# Patient Record
Sex: MALE | Race: BLACK OR AFRICAN AMERICAN | NOT HISPANIC OR LATINO | URBAN - METROPOLITAN AREA
[De-identification: names, ages, dates, MRNs, and addresses within clinical notes are randomized per-mention and may not be internally consistent; named-entity substitution may affect disease eponyms.]

---

## 2024-08-07 ENCOUNTER — INPATIENT (INPATIENT)
Facility: HOSPITAL | Age: 64
LOS: 0 days | Discharge: HOME | End: 2024-08-08
Attending: STUDENT IN AN ORGANIZED HEALTH CARE EDUCATION/TRAINING PROGRAM | Admitting: STUDENT IN AN ORGANIZED HEALTH CARE EDUCATION/TRAINING PROGRAM
Payer: MEDICARE

## 2024-08-07 ENCOUNTER — TRANSCRIPTION ENCOUNTER (OUTPATIENT)
Age: 64
End: 2024-08-07

## 2024-08-07 VITALS
TEMPERATURE: 98 F | WEIGHT: 190.04 LBS | RESPIRATION RATE: 16 BRPM | DIASTOLIC BLOOD PRESSURE: 85 MMHG | HEART RATE: 71 BPM | OXYGEN SATURATION: 99 % | SYSTOLIC BLOOD PRESSURE: 158 MMHG

## 2024-08-07 DIAGNOSIS — E87.8 OTHER DISORDERS OF ELECTROLYTE AND FLUID BALANCE, NOT ELSEWHERE CLASSIFIED: ICD-10-CM

## 2024-08-07 DIAGNOSIS — Z29.9 ENCOUNTER FOR PROPHYLACTIC MEASURES, UNSPECIFIED: ICD-10-CM

## 2024-08-07 DIAGNOSIS — N18.6 END STAGE RENAL DISEASE: ICD-10-CM

## 2024-08-07 DIAGNOSIS — E87.5 HYPERKALEMIA: ICD-10-CM

## 2024-08-07 DIAGNOSIS — D63.8 ANEMIA IN OTHER CHRONIC DISEASES CLASSIFIED ELSEWHERE: ICD-10-CM

## 2024-08-07 DIAGNOSIS — Z90.49 ACQUIRED ABSENCE OF OTHER SPECIFIED PARTS OF DIGESTIVE TRACT: Chronic | ICD-10-CM

## 2024-08-07 DIAGNOSIS — I10 ESSENTIAL (PRIMARY) HYPERTENSION: ICD-10-CM

## 2024-08-07 DIAGNOSIS — Z90.81 ACQUIRED ABSENCE OF SPLEEN: Chronic | ICD-10-CM

## 2024-08-07 LAB
ALBUMIN SERPL ELPH-MCNC: 4 G/DL — SIGNIFICANT CHANGE UP (ref 3.3–5)
ALP SERPL-CCNC: 63 U/L — SIGNIFICANT CHANGE UP (ref 40–120)
ALT FLD-CCNC: 11 U/L — SIGNIFICANT CHANGE UP (ref 4–41)
ANION GAP SERPL CALC-SCNC: 19 MMOL/L — HIGH (ref 7–14)
AST SERPL-CCNC: 13 U/L — SIGNIFICANT CHANGE UP (ref 4–40)
BASOPHILS # BLD AUTO: 0.02 K/UL — SIGNIFICANT CHANGE UP (ref 0–0.2)
BASOPHILS NFR BLD AUTO: 0.5 % — SIGNIFICANT CHANGE UP (ref 0–2)
BILIRUB SERPL-MCNC: 0.3 MG/DL — SIGNIFICANT CHANGE UP (ref 0.2–1.2)
BUN SERPL-MCNC: 106 MG/DL — HIGH (ref 7–23)
CALCIUM SERPL-MCNC: 8.6 MG/DL — SIGNIFICANT CHANGE UP (ref 8.4–10.5)
CALCIUM SERPL-MCNC: 8.9 MG/DL — SIGNIFICANT CHANGE UP (ref 8.4–10.5)
CHLORIDE SERPL-SCNC: 98 MMOL/L — SIGNIFICANT CHANGE UP (ref 98–107)
CO2 SERPL-SCNC: 19 MMOL/L — LOW (ref 22–31)
CO2 SERPL-SCNC: 23 MMOL/L — SIGNIFICANT CHANGE UP (ref 22–31)
CREAT SERPL-MCNC: 20.43 MG/DL — HIGH (ref 0.5–1.3)
DIALYSIS INSTRUMENT RESULT - HEPATITIS B SURFACE ANTIGEN: NEGATIVE — SIGNIFICANT CHANGE UP
EGFR: 2 ML/MIN/1.73M2 — LOW
EOSINOPHIL # BLD AUTO: 0.23 K/UL — SIGNIFICANT CHANGE UP (ref 0–0.5)
EOSINOPHIL NFR BLD AUTO: 5.2 % — SIGNIFICANT CHANGE UP (ref 0–6)
GLUCOSE BLDC GLUCOMTR-MCNC: 105 MG/DL — HIGH (ref 70–99)
GLUCOSE BLDC GLUCOMTR-MCNC: 93 MG/DL — SIGNIFICANT CHANGE UP (ref 70–99)
GLUCOSE SERPL-MCNC: 106 MG/DL — HIGH (ref 70–99)
HCT VFR BLD CALC: 26.2 % — LOW (ref 39–50)
HCT VFR BLD CALC: 26.9 % — LOW (ref 39–50)
HGB BLD-MCNC: 8.5 G/DL — LOW (ref 13–17)
HGB BLD-MCNC: 8.9 G/DL — LOW (ref 13–17)
IANC: 2.93 K/UL — SIGNIFICANT CHANGE UP (ref 1.8–7.4)
IMM GRANULOCYTES NFR BLD AUTO: 0.2 % — SIGNIFICANT CHANGE UP (ref 0–0.9)
LYMPHOCYTES # BLD AUTO: 0.71 K/UL — LOW (ref 1–3.3)
LYMPHOCYTES # BLD AUTO: 16 % — SIGNIFICANT CHANGE UP (ref 13–44)
MAGNESIUM SERPL-MCNC: 1.9 MG/DL — SIGNIFICANT CHANGE UP (ref 1.6–2.6)
MAGNESIUM SERPL-MCNC: 2.1 MG/DL — SIGNIFICANT CHANGE UP (ref 1.6–2.6)
MCHC RBC-ENTMCNC: 31.3 PG — SIGNIFICANT CHANGE UP (ref 27–34)
MCHC RBC-ENTMCNC: 31.4 PG — SIGNIFICANT CHANGE UP (ref 27–34)
MCHC RBC-ENTMCNC: 32.4 GM/DL — SIGNIFICANT CHANGE UP (ref 32–36)
MCHC RBC-ENTMCNC: 33.1 GM/DL — SIGNIFICANT CHANGE UP (ref 32–36)
MCV RBC AUTO: 94.7 FL — SIGNIFICANT CHANGE UP (ref 80–100)
MCV RBC AUTO: 96.7 FL — SIGNIFICANT CHANGE UP (ref 80–100)
MONOCYTES # BLD AUTO: 0.54 K/UL — SIGNIFICANT CHANGE UP (ref 0–0.9)
MONOCYTES NFR BLD AUTO: 12.2 % — SIGNIFICANT CHANGE UP (ref 2–14)
NEUTROPHILS # BLD AUTO: 2.93 K/UL — SIGNIFICANT CHANGE UP (ref 1.8–7.4)
NEUTROPHILS NFR BLD AUTO: 65.9 % — SIGNIFICANT CHANGE UP (ref 43–77)
NRBC # BLD: 0 /100 WBCS — SIGNIFICANT CHANGE UP (ref 0–0)
NRBC # BLD: 0 /100 WBCS — SIGNIFICANT CHANGE UP (ref 0–0)
NRBC # FLD: 0 K/UL — SIGNIFICANT CHANGE UP (ref 0–0)
NRBC # FLD: 0 K/UL — SIGNIFICANT CHANGE UP (ref 0–0)
PHOSPHATE SERPL-MCNC: 4.9 MG/DL — HIGH (ref 2.5–4.5)
PLATELET # BLD AUTO: 189 K/UL — SIGNIFICANT CHANGE UP (ref 150–400)
PLATELET # BLD AUTO: 199 K/UL — SIGNIFICANT CHANGE UP (ref 150–400)
POTASSIUM SERPL-MCNC: 6.7 MMOL/L — CRITICAL HIGH (ref 3.5–5.3)
POTASSIUM SERPL-SCNC: 6.7 MMOL/L — CRITICAL HIGH (ref 3.5–5.3)
PROT SERPL-MCNC: 6.9 G/DL — SIGNIFICANT CHANGE UP (ref 6–8.3)
RBC # BLD: 2.71 M/UL — LOW (ref 4.2–5.8)
RBC # BLD: 2.84 M/UL — LOW (ref 4.2–5.8)
RBC # FLD: 14.8 % — HIGH (ref 10.3–14.5)
RBC # FLD: 14.8 % — HIGH (ref 10.3–14.5)
SODIUM SERPL-SCNC: 136 MMOL/L — SIGNIFICANT CHANGE UP (ref 135–145)
WBC # BLD: 4.44 K/UL — SIGNIFICANT CHANGE UP (ref 3.8–10.5)
WBC # BLD: 5.29 K/UL — SIGNIFICANT CHANGE UP (ref 3.8–10.5)
WBC # FLD AUTO: 4.44 K/UL — SIGNIFICANT CHANGE UP (ref 3.8–10.5)
WBC # FLD AUTO: 5.29 K/UL — SIGNIFICANT CHANGE UP (ref 3.8–10.5)

## 2024-08-07 PROCEDURE — 99223 1ST HOSP IP/OBS HIGH 75: CPT | Mod: GC

## 2024-08-07 PROCEDURE — 99285 EMERGENCY DEPT VISIT HI MDM: CPT

## 2024-08-07 RX ORDER — CYANOCOBALAMIN/FOLIC AC/VIT B6 2-2.5-25MG
1 TABLET ORAL
Refills: 0 | DISCHARGE

## 2024-08-07 RX ORDER — SODIUM BICARBONATE 0.9MEQ/ML
50 SYRINGE (ML) INTRAVENOUS ONCE
Refills: 0 | Status: COMPLETED | OUTPATIENT
Start: 2024-08-07 | End: 2024-08-07

## 2024-08-07 RX ORDER — HEPARIN SODIUM 1000 [USP'U]/ML
5000 INJECTION, SOLUTION INTRAVENOUS; SUBCUTANEOUS EVERY 12 HOURS
Refills: 0 | Status: DISCONTINUED | OUTPATIENT
Start: 2024-08-07 | End: 2024-08-08

## 2024-08-07 RX ORDER — SEVELAMER CARBONATE 800 MG/1
2 TABLET, FILM COATED ORAL
Refills: 0 | DISCHARGE

## 2024-08-07 RX ORDER — LABETALOL HCL 200 MG
200 TABLET ORAL
Refills: 0 | Status: DISCONTINUED | OUTPATIENT
Start: 2024-08-07 | End: 2024-08-08

## 2024-08-07 RX ORDER — SODIUM ZIRCONIUM CYCLOSILICATE 10 G/10G
10 POWDER, FOR SUSPENSION ORAL ONCE
Refills: 0 | Status: COMPLETED | OUTPATIENT
Start: 2024-08-07 | End: 2024-08-07

## 2024-08-07 RX ORDER — AMLODIPINE BESYLATE 2.5 MG/1
1 TABLET ORAL
Refills: 0 | DISCHARGE

## 2024-08-07 RX ORDER — MELATONIN 3 MG
3 TABLET ORAL AT BEDTIME
Refills: 0 | Status: DISCONTINUED | OUTPATIENT
Start: 2024-08-07 | End: 2024-08-08

## 2024-08-07 RX ORDER — DEXTROSE 4 G
25 TABLET,CHEWABLE ORAL ONCE
Refills: 0 | Status: COMPLETED | OUTPATIENT
Start: 2024-08-07 | End: 2024-08-07

## 2024-08-07 RX ORDER — SEVELAMER CARBONATE 800 MG/1
800 TABLET, FILM COATED ORAL THREE TIMES A DAY
Refills: 0 | Status: DISCONTINUED | OUTPATIENT
Start: 2024-08-07 | End: 2024-08-08

## 2024-08-07 RX ORDER — LABETALOL HCL 200 MG
1 TABLET ORAL
Refills: 0 | DISCHARGE

## 2024-08-07 RX ORDER — CYANOCOBALAMIN/FOLIC AC/VIT B6 2-2.5-25MG
1 TABLET ORAL DAILY
Refills: 0 | Status: DISCONTINUED | OUTPATIENT
Start: 2024-08-07 | End: 2024-08-08

## 2024-08-07 RX ORDER — FUROSEMIDE 10 MG/ML
1 INJECTION, SOLUTION INTRAVENOUS
Refills: 0 | DISCHARGE

## 2024-08-07 RX ADMIN — SODIUM ZIRCONIUM CYCLOSILICATE 10 GRAM(S): 10 POWDER, FOR SUSPENSION ORAL at 18:55

## 2024-08-07 RX ADMIN — Medication 25 GRAM(S): at 18:56

## 2024-08-07 RX ADMIN — Medication 100 GRAM(S): at 18:53

## 2024-08-07 RX ADMIN — Medication 50 MILLIEQUIVALENT(S): at 18:56

## 2024-08-07 RX ADMIN — Medication 5 UNIT(S): at 18:57
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
## 2024-08-07 NOTE — DISCHARGE NOTE PROVIDER - HOSPITAL COURSE
Discharge Summary     Admit Date: 08-07-24  Discharge Date:08-08-24    Admission diagnoses: Missed HD   Discharge diagnoses: Missed HD     Hospital Course:   For full details, please see H&P, progress notes, consult notes and ancillary notes. Mr. Woodward is a 62 yo M PMH of ESRD(MWF) presenting for missed HD being admitted for dialysis. The patient was hyperkalemic and shifted with insulin, D50, and lokelma, no EKG changes noted. The patient was seen and evaluated by nephrology and received HD. The pt had an XR done which was       On day of discharge, patient is clinically stable with no new exam findings or acute symptoms compared to prior. The patient was seen by the attending physician on the date of discharge and deemed stable and acceptable for discharge. The patient's chronic medical conditions were treated accordingly per the patient's home medication regimen. The patient's medication reconciliation (with changes made to chronic medications), follow up appointments, discharge orders, instructions, and significant lab and diagnostic studies are as noted.     Discharge follow up action items:     1. Follow up with PCP in 1-2 weeks. Follow up with Dialysis center Friday. Follow up with Dr. Benjamin with one week.  2. Medication changes: None     Patient disposition:Home   Discharge Summary     Admit Date: 08-07-24  Discharge Date:08-08-24    Admission diagnoses: Missed HD   Discharge diagnoses: Missed HD     Hospital Course:   For full details, please see H&P, progress notes, consult notes and ancillary notes. Mr. Woodward is a 62 yo M PMH of ESRD(MWF) presenting for missed HD being admitted for dialysis. The patient was hyperkalemic and shifted with insulin, D50, and lokelma, no EKG changes noted. The patient was seen and evaluated by nephrology and received HD. Repeat BMP was not notable      On day of discharge, patient is clinically stable with no new exam findings or acute symptoms compared to prior. The patient was seen by the attending physician on the date of discharge and deemed stable and acceptable for discharge. The patient's chronic medical conditions were treated accordingly per the patient's home medication regimen. The patient's medication reconciliation (with changes made to chronic medications), follow up appointments, discharge orders, instructions, and significant lab and diagnostic studies are as noted.     Discharge follow up action items:     1. Follow up with PCP in 1-2 weeks. Follow up with Dialysis center Friday. Follow up with Dr. Benjamin with one week.  2. Medication changes: None     Patient disposition:Home   Discharge Summary     Admit Date: 08-07-24  Discharge Date:08-08-24    Admission diagnoses: Missed HD   Discharge diagnoses: Missed HD     Hospital Course:   For full details, please see H&P, progress notes, consult notes and ancillary notes. Mr. Woodward is a 62 yo M PMH of ESRD(MWF) presenting for missed HD being admitted for dialysis. The patient was hyperkalemic 6.7 and shifted with insulin, D50, and lokelma, no EKG changes noted. The patient was seen and evaluated by nephrology and received HD. Repeat BMP with K 3.8. The patient completed 3 hours of HD and 3L net fluid removal per RN notes. Patient is oriented x3 with normal lung exam. Not volume overloaded. Discussed with patient and wife, Aby at bedside that the patient needs to continue to follow up with his routine HD schedule, next to be completed on Friday in [place]. He has a flight this AM to return to [place]. Per wife, she has been in contact with HD in [place] and he is on schedule for Friday. The patient is aware to present to the hospital if any further missed HD sessions. All questions answered.      On day of discharge, patient is clinically stable with no new exam findings or acute symptoms compared to prior. The patient was seen by the attending physician on the date of discharge and deemed stable and acceptable for discharge. The patient's chronic medical conditions were treated accordingly per the patient's home medication regimen. The patient's medication reconciliation (with changes made to chronic medications), follow up appointments, discharge orders, instructions, and significant lab and diagnostic studies are as noted.     Discharge follow up action items:     1. Follow up with PCP in 1-2 weeks. Follow up with Dialysis center Friday. Follow up with Dr. Benjamin with one week.  2. Medication changes: None     Patient disposition:Home

## 2024-08-07 NOTE — H&P ADULT - ATTENDING COMMENTS
I have personally seen, examined, and participated in the care of this patient.  I have reviewed all pertinent clinical information, including history, physical exam, plan, and the resident's note and agree except as noted.    63M with PMHx of ESRD(MWF) and HTN presenting for missed HD. Also with hyperkalemia to 6.7    #ESRD needing HD  #Hyperkalemia  Last HD was 8/2. K 6.7 and s/p Ca gluconate, sodium bicarb, insulin/D50, lokelma  -tele  -getting HD right now  -nephro consulted  -discussed extensively with patient and wife Aby at bedside. Patient has placement for HD in [place] setup (this is where he lives and routinely gets HD) and does not need reinstated with help of SW. He was not taken off schedule per wife. If hyperkalemia is resolved with post-HD BMP, will likely discharge. Patient has a flight at 6AM to return to [place]. He has HD scheduled for Friday as well and knows to make it to this upon return.    #HTN  Resume labetalol  Hold lasix and amlodipine PRN    #Anemia  Hgb stable to prior. 8.6. No bleeding. Likely AOCD in setting of ESRD  F/u renal in [place]

## 2024-08-07 NOTE — DISCHARGE NOTE PROVIDER - NSDCFUADDAPPT_GEN_ALL_CORE_FT
APPTS ARE READY TO BE MADE: [ ] YES    Best Family or Patient Contact (if needed):    Additional Information about above appointments (if needed):    1: Nephrology   2:   3:     Other comments or requests:

## 2024-08-07 NOTE — H&P ADULT - TIME BILLING
I had a face to face encounter with this patient. I spent 77 total minutes on the bedside interview and examination, coordination of care, counseling, chart review, order placement and documentation for this patient.    This time spent by me is independent of the time spent by the resident managing the patient
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
## 2024-08-07 NOTE — ED ADULT NURSE REASSESSMENT NOTE - NS ED NURSE REASSESS COMMENT FT1
Pt resting in stretcher A&Ox4, ambulatory, accompanied by wife, denies complaints at this time. RR equal and unlabored. Medicated as ordered. Placed on cardiac monitor. Care plan continued. Comfort measures provided. Safety maintained.     Report given to dialysis RN Bharti. Awaiting transport.
pt is at dialysis, this RN has not seen pt, this RN will assess pt once he returns from dialysis.
This RN received report from dialysis nurse Zeus, per RN pt had a stable dialysis with no issues, 3L removed 3 hours.

## 2024-08-07 NOTE — DISCHARGE NOTE PROVIDER - NSDCMRMEDTOKEN_GEN_ALL_CORE_FT
amLODIPine 10 mg oral tablet: 1 tab(s) orally once a day PRN for hypertension  labetalol 200 mg oral tablet: 1 tab(s) orally 2 times a day MWF  Lasix 80 mg oral tablet: 1 tab(s) orally once a day Hypertension/Fluid overload  Nephro-Charli oral tablet: 1 tab(s) orally once a day  sevelamer carbonate 800 mg oral tablet: 2 tab(s) orally 3 times a day
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
## 2024-08-07 NOTE — H&P ADULT - NSHPREVIEWOFSYSTEMS_GEN_ALL_CORE
REVIEW OF SYSTEMS:    CONSTITUTIONAL:  No weakness, fevers or chills  EYES/ENT:  No visual changes;  No vertigo or throat pain   NECK:  No pain or stiffness  RESPIRATORY:  No cough, wheezing, hemoptysis; No shortness of breath  CARDIOVASCULAR:  No chest pain or palpitations  GASTROINTESTINAL:  No abdominal or epigastric pain. No nausea, vomiting, or hematemesis; No diarrhea or constipation. No melena or hematochezia.  GENITOURINARY:  No dysuria, frequency or hematuria  MUSCULOSKELETAL:  FROM all extremities, normal strength  NEUROLOGICAL:  No numbness or weakness  SKIN:  No itching, rashes REVIEW OF SYSTEMS:    CONSTITUTIONAL:  No  fevers or chills  EYES/ENT:  No visual changes  RESPIRATORY:  No SOB  CARDIOVASCULAR:  No chest pain  GASTROINTESTINAL:  No abdominal or epigastric pain. No nausea, vomiting, or hematemesis  MUSCULOSKELETAL:  FROM all extremities, normal strength  NEUROLOGICAL:  No numbness   SKIN:  No itching ROS:    Constitutional: [ ] fevers [ ] chills   HEENT: [ ] postnasal drip [ ] nasal congestion  CV: [ ] chest pain [ ] orthopnea [ ] palpitations [ ] murmur  Resp: [ ] cough [ ] shortness of breath [ ] dyspnea [ ] wheezing   GI: [ ] nausea [ ] vomiting [ ] diarrhea [ ] constipation [ ] abd pain  : [ ] dysuria [ ] nocturia [ ] hematuria [ ] increased urinary frequency  Musculoskeletal: [ ] back pain [ ] myalgias [ ] arthralgias [ ] fracture  Skin: [ ] rash [ ] itch  Neurological: [ ] headache [ ] dizziness [ ] syncope   Endocrine: [ ] diabetes [ ] thyroid problem  Hematologic/Lymphatic: [ ] anemia [ ] bleeding problem  [x ] All other systems negative

## 2024-08-07 NOTE — DISCHARGE NOTE PROVIDER - NSDCCPCAREPLAN_GEN_ALL_CORE_FT
PRINCIPAL DISCHARGE DIAGNOSIS  Diagnosis: ESRD on dialysis  Assessment and Plan of Treatment: You came in because you missed dialysis, we treated your elevated potassium with lokelma, insulin, and did an EKG which was normal. You were evaluated by nephrology and recommeded for dialysis. If you develop fevers, chills, nausea, vomiting, or feel overloaded please seek urgent medical attention.     PRINCIPAL DISCHARGE DIAGNOSIS  Diagnosis: ESRD on dialysis  Assessment and Plan of Treatment: You came in because you missed dialysis, we treated your elevated potassium with lokelma, insulin, and did an EKG which was normal. You were evaluated by nephrology and recommeded for dialysis and received dialysis . If you develop fevers, chills, nausea, vomiting, or feel overloaded please seek urgent medical attention.

## 2024-08-07 NOTE — CONSULT NOTE ADULT - PROBLEM SELECTOR RECOMMENDATION 9
Pt. with ESRD on HD TIW (MWF schedule) via [facility]. Last outpatient HD treatment was on 8/3/24. Pt. is euvolemic on exam. Pt. clinically stable. Labs reviewed. Hgb (8.5), below goal for ESRD. Start EMMA with next HD. Phos (4.9), at goal for ESRD. Continue home binders, sevelamer 800mg tid. Plan for maintenance HD today. HD consent obtained and placed in pt's chart. Monitor BP and labs. Plans to return to [place] on Friday/Saturday. Will need to arrange HD on Friday depending on pts travel plans. Pt. with ESRD on HD TIW (MWF schedule) via [facility]. Last outpatient HD treatment was on 8/2/24. Pt. is euvolemic on exam. Pt. clinically stable. Labs reviewed. Hgb (8.5), below goal for ESRD. Start EMMA with next HD. Phos (4.9), at goal for ESRD. Continue home binders, sevelamer 800mg tid. Plan for maintenance HD today. HD consent obtained and placed in pt's chart. Monitor BP and labs. Plans to return to [place] on Friday/Saturday. Will need to arrange HD on Friday depending on pts travel plans.

## 2024-08-07 NOTE — H&P ADULT - NSHPPHYSICALEXAM_GEN_ALL_CORE
T(C): 36.7 (08-07-24 @ 16:55), Max: 36.8 (08-07-24 @ 15:02)  HR: 67 (08-07-24 @ 16:55) (67 - 71)  BP: 156/85 (08-07-24 @ 16:55) (156/85 - 158/85)  RR: 18 (08-07-24 @ 16:55) (16 - 18)  SpO2: 100% (08-07-24 @ 16:55) (99% - 100%)    CONSTITUTIONAL: Well groomed, no apparent distress  EYES: PERRLA and symmetric, EOMI, No conjunctival or scleral injection, non-icteric  ENMT: Oral mucosa with moist membranes. Normal dentition; no pharyngeal injection or exudates  RESP: No respiratory distress, no use of accessory muscles; CTA b/l, no WRR  CV: RRR, +S1S2, no MRG; no JVD; no peripheral edema  GI: Soft, NT, ND, no rebound, no guarding; no palpable masses; no hepatosplenomegaly; no hernia palpated  MSK:  Normal ROM without pain, no spinal tenderness, normal muscle strength/tone  SKIN: No rashes or ulcers noted; no subcutaneous nodules or induration palpable  NEURO: CN II-XII intact; normal reflexes in upper and lower extremities, sensation intact in upper and lower extremities b/l to light touch   PSYCH: Appropriate insight/judgment; A+O x 3, mood and affect appropriate, recent/remote memory intact T(C): 36.7 (08-07-24 @ 16:55), Max: 36.8 (08-07-24 @ 15:02)  HR: 67 (08-07-24 @ 16:55) (67 - 71)  BP: 156/85 (08-07-24 @ 16:55) (156/85 - 158/85)  RR: 18 (08-07-24 @ 16:55) (16 - 18)  SpO2: 100% (08-07-24 @ 16:55) (99% - 100%)    CONSTITUTIONAL: Well groomed, no apparent distress  EYES: PERRLA and symmetric, EOMI, No conjunctival or scleral injection, non-icteric  ENMT: Oral mucosa with moist membranes. Normal dentition; no pharyngeal injection or exudates  RESP: No respiratory distress, no use of accessory muscles; CTA b/l, no WRR  CV: RRR,   GI: Soft, NT, ND, no rebound  MSK:  Normal ROM without pain, no spinal tenderness, normal muscle strength/tone  SKIN: No rashes or ulcers noted; no subcutaneous nodules or induration palpable  NEURO: CN II-XII intact; normal reflexes in upper and lower extremities, sensation intact in upper and lower extremities b/l to light touch   PSYCH: Appropriate insight/judgment; A+O x 3, mood and affect appropriate, recent/remote memory intact T(C): 36.7 (08-07-24 @ 16:55), Max: 36.8 (08-07-24 @ 15:02)  HR: 67 (08-07-24 @ 16:55) (67 - 71)  BP: 156/85 (08-07-24 @ 16:55) (156/85 - 158/85)  RR: 18 (08-07-24 @ 16:55) (16 - 18)  SpO2: 100% (08-07-24 @ 16:55) (99% - 100%)    CONSTITUTIONAL: Well groomed, no apparent distress  EYES: PERRLA and symmetric, EOMI, No conjunctival or scleral injection, non-icteric  ENMT: Oral mucosa with moist membranes. Normal dentition; no pharyngeal injection or exudates  RESP: No respiratory distress, no use of accessory muscles; CTA b/l, no WRR  CV: RRR, no murmurs, no edema  GI: Soft, NT, ND, no rebound  MSK:  Normal ROM without pain, no spinal tenderness, normal muscle strength/tone  SKIN: No rashes or ulcers noted; no subcutaneous nodules or induration palpable  NEURO: CN II-XII intact; normal reflexes in upper and lower extremities, sensation intact in upper and lower extremities b/l to light touch

## 2024-08-07 NOTE — H&P ADULT - NSHPLABSRESULTS_GEN_ALL_CORE
LABS:                          8.5    4.44  )-----------( 189      ( 07 Aug 2024 17:37 )             26.2     08-07    136  |  98  |  106<H>  ----------------------------<  106<H>  6.7<HH>   |  19<L>  |  20.43<H>    Ca    8.6      07 Aug 2024 17:37  Phos  4.9     08-07  Mg     2.10     08-07    TPro  6.9  /  Alb  4.0  /  TBili  0.3  /  DBili  x   /  AST  13  /  ALT  11  /  AlkPhos  63  08-07    LIVER FUNCTIONS - ( 07 Aug 2024 17:37 )  Alb: 4.0 g/dL / Pro: 6.9 g/dL / ALK PHOS: 63 U/L / ALT: 11 U/L / AST: 13 U/L / GGT: x             Urinalysis Basic - ( 07 Aug 2024 17:37 )    Color: x / Appearance: x / SG: x / pH: x  Gluc: 106 mg/dL / Ketone: x  / Bili: x / Urobili: x   Blood: x / Protein: x / Nitrite: x   Leuk Esterase: x / RBC: x / WBC x   Sq Epi: x / Non Sq Epi: x / Bacteria: x LABS:                          8.5    4.44  )-----------( 189      ( 07 Aug 2024 17:37 )             26.2     08-07    136  |  98  |  106<H>  ----------------------------<  106<H>  6.7<HH>   |  19<L>  |  20.43<H>    Ca    8.6      07 Aug 2024 17:37  Phos  4.9     08-07  Mg     2.10     08-07    TPro  6.9  /  Alb  4.0  /  TBili  0.3  /  DBili  x   /  AST  13  /  ALT  11  /  AlkPhos  63  08-07    LIVER FUNCTIONS - ( 07 Aug 2024 17:37 )  Alb: 4.0 g/dL / Pro: 6.9 g/dL / ALK PHOS: 63 U/L / ALT: 11 U/L / AST: 13 U/L / GGT: x             Urinalysis Basic - ( 07 Aug 2024 17:37 )    Color: x / Appearance: x / SG: x / pH: x  Gluc: 106 mg/dL / Ketone: x  / Bili: x / Urobili: x   Blood: x / Protein: x / Nitrite: x   Leuk Esterase: x / RBC: x / WBC x   Sq Epi: x / Non Sq Epi: x / Bacteria: x    EKG 8/7/24: 1st degree AV block Labs reviewed by me:                          8.5    4.44  )-----------( 189      ( 07 Aug 2024 17:37 )             26.2     08-07    136  |  98  |  106<H>  ----------------------------<  106<H>  6.7<HH>   |  19<L>  |  20.43<H>    Ca    8.6      07 Aug 2024 17:37  Phos  4.9     08-07  Mg     2.10     08-07    TPro  6.9  /  Alb  4.0  /  TBili  0.3  /  DBili  x   /  AST  13  /  ALT  11  /  AlkPhos  63  08-07    LIVER FUNCTIONS - ( 07 Aug 2024 17:37 )  Alb: 4.0 g/dL / Pro: 6.9 g/dL / ALK PHOS: 63 U/L / ALT: 11 U/L / AST: 13 U/L / GGT: x             Urinalysis Basic - ( 07 Aug 2024 17:37 )    Color: x / Appearance: x / SG: x / pH: x  Gluc: 106 mg/dL / Ketone: x  / Bili: x / Urobili: x   Blood: x / Protein: x / Nitrite: x   Leuk Esterase: x / RBC: x / WBC x   Sq Epi: x / Non Sq Epi: x / Bacteria: x    EKG 8/7/24: 1st degree AV block, no STD or ELISA

## 2024-08-07 NOTE — ED PROVIDER NOTE - PHYSICAL EXAMINATION
Const: Awake, alert, no acute distress.  Well appearing.  Moving comfortably on stretcher.  HEENT: NC/AT.  Moist mucous membranes.  No pharyngeal erythema, no exudates.  Eyes: Extraocular movements intact b/l.  Pupils equal, round, and reactive to light b/l.  Neck: Neck supple, full ROM without pain.  Cardiac: Regular rate and regular rhythm. S1 S2 present.  Peripheral pulses 2+ and symmetric.  Minimal LE edema.    Resp: Speaking in full sentences. No evidence of respiratory distress.  Good air entry b/l, breath sounds clear to auscultation b/l.  Abd: Non-distended.  MSK: Normal ROM and strength throughout.  Skin: Normal coloration.  No rashes, abrasions or lacerations.  Neuro: Awake, alert & oriented x 3.  Moves all extremities spontaneously and symmetrically.  No focal deficits. Const: Awake, alert, no acute distress.  Well appearing.  Moving comfortably on stretcher.  HEENT: NC/AT.  Moist mucous membranes.  No pharyngeal erythema, no exudates.  Eyes: Extraocular movements intact b/l.  Pupils equal, round, and reactive to light b/l.  Neck: Neck supple, full ROM without pain.  Cardiac: Regular rate and regular rhythm. S1 S2 present.  Peripheral pulses 2+ and symmetric.  Minimal LE edema.    Resp: Speaking in full sentences. No evidence of respiratory distress.  Good air entry b/l, breath sounds clear to auscultation b/l.  Abd: Non-distended.  MSK: Normal ROM and strength throughout.  Skin: Normal coloration.  No rashes, abrasions or lacerations.  Neuro: Awake, alert & oriented x 3.  Moves all extremities spontaneously and symmetrically.  No focal deficits.  ATTENDING PHYSICAL EXAM  GEN - NAD; well appearing; A+O x3  HEAD - NC/AT; EYES/NOSE - PERRL, EOMI, mucous membranes moist, no discharge; THROAT: Oral cavity and pharynx normal. No inflammation, swelling, exudate, or lesions  NECK: Neck supple, non-tender without lymphadenopathy, no masses, no JVD  PULMONARY - CTA b/l, symmetric breath sounds, no w/r/r  CARDIAC -s1s2, RRR, no M,R,G  ABDOMEN - +NABS, ND, NT, soft, no guarding, no rebound, no masses   BACK - no CVA tenderness, No vertebral or paravertebral tenderness  EXTREMITIES - + RUE AVF, symmetric pulses, 2+ dp, capillary refill < 2 seconds, no clubbing, no cyanosis, no edema

## 2024-08-07 NOTE — H&P ADULT - PROBLEM/PLAN-4
Patient discharged to home in stable condition with mom and dad. Bands verified with mom.   DISPLAY PLAN FREE TEXT
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
## 2024-08-07 NOTE — CONSULT NOTE ADULT - SUBJECTIVE AND OBJECTIVE BOX
[facility] DIVISION OF KIDNEY DISEASES AND HYPERTENSION -- [phone]  -- INITIAL CONSULT NOTE  --------------------------------------------------------------------------------  HPI: 64yo M w/ PMH of ESRD on HD (MWF) and HTN p/w missed HD. Nephrology consulted for ESRD/HD management.    Pt seen and examined at bedside. Pt reports intermittents GOMEZ, otherwise feels well. Pt is visiting from [place] for [employer]. Plans to return to [place] on Friday/Saturday. Last HD was on 8/2/24. Reports being on HD for 5yrs. Unknown cause for renal failure, but pt believes due to chronic use of nasal spray. HD treatments are 3-3.5hrs long, with 3L UF. Denies IDH. Continues to make urine, reports UOP normal. Denies HA, fevers, chills, N/V, diarrhea, abd pain and LE swelling.     PAST HISTORY  --------------------------------------------------------------------------------  PAST MEDICAL & SURGICAL HISTORY:  ESRD on dialysis  Hypertension  H/O splenectomy  S/P cholecystectomy    FAMILY HISTORY:  No pertinent family history in first degree relatives    PAST SOCIAL HISTORY: N/A    ALLERGIES & MEDICATIONS  --------------------------------------------------------------------------------  Allergies    Demerol HCl (Unknown)  erythromycin (Unknown)  amoxicillin (Unknown)  sulfa drugs (Unknown)  tigecycline (Unknown)  tetracycline (Rash)  macrolide antibiotics (Other)  penicillin (Hives; Pruritus)  duracef (Pruritus)    Intolerances    Standing Inpatient Medications  heparin   Injectable 5000 Unit(s) SubCutaneous every 12 hours  labetalol 200 milliGRAM(s) Oral <User Schedule>  multivitamin 1 Tablet(s) Oral daily  sevelamer carbonate 800 milliGRAM(s) Oral three times a day    PRN Inpatient Medications  melatonin 3 milliGRAM(s) Oral at bedtime PRN    REVIEW OF SYSTEMS  --------------------------------------------------------------------------------  Gen: No fevers/chills  Skin: No rashes  Head/Eyes/Ears: No HA  Respiratory: Intermittent GOMEZ, no cough  CV: No CP  GI: No abdominal pain, diarrhea, N/V  : No dysuria, hematuria  MSK: No edema  Heme: No easy bruising or bleeding  Psych: No significant depression    All other systems were reviewed and are negative, except as noted.    VITALS/PHYSICAL EXAM  --------------------------------------------------------------------------------  T(C): 36.4 (08-07-24 @ 20:30), Max: 36.8 (08-07-24 @ 15:02)  HR: 67 (08-07-24 @ 16:55) (67 - 71)  BP: 160/86 (08-07-24 @ 20:30) (156/85 - 160/86)  RR: 18 (08-07-24 @ 20:30) (16 - 18)  SpO2: 100% (08-07-24 @ 16:55) (99% - 100%)  Wt(kg): --    Weight (kg): 86.2 (08-07-24 @ 15:02)    Physical Exam:  	Gen: NAD  	HEENT: MMM  	Pulm: CTA B/L  	CV: S1S2  	Abd: Soft, +BS   	Ext: No LE edema B/L  	Neuro: Awake  	Skin: Warm and dry  	Vascular access: LUE AVF with thrill     LABS/STUDIES  --------------------------------------------------------------------------------              8.5    4.44  >-----------<  189      [08-07-24 @ 17:37]              26.2     136  |  98  |  106  ----------------------------<  106      [08-07-24 @ 17:37]  6.7   |  19  |  20.43        Ca     8.6     [08-07-24 @ 17:37]      Mg     2.10     [08-07-24 @ 17:37]      Phos  4.9     [08-07-24 @ 17:37]    TPro  6.9  /  Alb  4.0  /  TBili  0.3  /  DBili  x   /  AST  13  /  ALT  11  /  AlkPhos  63  [08-07-24 @ 17:37]    Creatinine Trend:  SCr 20.43 [08-07 @ 17:37] [facility] DIVISION OF KIDNEY DISEASES AND HYPERTENSION -- [phone]  -- INITIAL CONSULT NOTE  --------------------------------------------------------------------------------  HPI: 62yo M w/ PMH of ESRD on HD (MWF) and HTN p/w missed HD. Nephrology consulted for ESRD/HD management.    Pt seen and examined at bedside. Pt reports intermittent GOMEZ, otherwise feels well. Pt is visiting from [place] for nieces birthday. Plans to return to [place] on Friday/Saturday. Last HD was on 8/2/24. Reports being on HD for 5yrs. Unknown cause for renal failure, but pt believes due to chronic use of nasal spray. HD treatments are 3-3.5hrs long, with 3L UF. Denies IDH. Continues to make urine, reports UOP normal. Denies HA, fevers, chills, N/V, diarrhea, abd pain and LE swelling.     PAST HISTORY  --------------------------------------------------------------------------------  PAST MEDICAL & SURGICAL HISTORY:  ESRD on dialysis  Hypertension  H/O splenectomy  S/P cholecystectomy    FAMILY HISTORY:  No pertinent family history in first degree relatives    PAST SOCIAL HISTORY: N/A    ALLERGIES & MEDICATIONS  --------------------------------------------------------------------------------  Allergies    Demerol HCl (Unknown)  erythromycin (Unknown)  amoxicillin (Unknown)  sulfa drugs (Unknown)  tigecycline (Unknown)  tetracycline (Rash)  macrolide antibiotics (Other)  penicillin (Hives; Pruritus)  duracef (Pruritus)    Intolerances    Standing Inpatient Medications  heparin   Injectable 5000 Unit(s) SubCutaneous every 12 hours  labetalol 200 milliGRAM(s) Oral <User Schedule>  multivitamin 1 Tablet(s) Oral daily  sevelamer carbonate 800 milliGRAM(s) Oral three times a day    PRN Inpatient Medications  melatonin 3 milliGRAM(s) Oral at bedtime PRN    REVIEW OF SYSTEMS  --------------------------------------------------------------------------------  Gen: No fevers/chills  Skin: No rashes  Head/Eyes/Ears: No HA  Respiratory: Intermittent GOMEZ, no cough  CV: No CP  GI: No abdominal pain, diarrhea, N/V  : No dysuria, hematuria  MSK: No edema  Heme: No easy bruising or bleeding  Psych: No significant depression    All other systems were reviewed and are negative, except as noted.    VITALS/PHYSICAL EXAM  --------------------------------------------------------------------------------  T(C): 36.4 (08-07-24 @ 20:30), Max: 36.8 (08-07-24 @ 15:02)  HR: 67 (08-07-24 @ 16:55) (67 - 71)  BP: 160/86 (08-07-24 @ 20:30) (156/85 - 160/86)  RR: 18 (08-07-24 @ 20:30) (16 - 18)  SpO2: 100% (08-07-24 @ 16:55) (99% - 100%)  Wt(kg): --    Weight (kg): 86.2 (08-07-24 @ 15:02)    Physical Exam:  	Gen: NAD  	HEENT: MMM  	Pulm: CTA B/L  	CV: S1S2  	Abd: Soft, +BS   	Ext: No LE edema B/L  	Neuro: Awake  	Skin: Warm and dry  	Vascular access: LUE AVF with thrill     LABS/STUDIES  --------------------------------------------------------------------------------              8.5    4.44  >-----------<  189      [08-07-24 @ 17:37]              26.2     136  |  98  |  106  ----------------------------<  106      [08-07-24 @ 17:37]  6.7   |  19  |  20.43        Ca     8.6     [08-07-24 @ 17:37]      Mg     2.10     [08-07-24 @ 17:37]      Phos  4.9     [08-07-24 @ 17:37]    TPro  6.9  /  Alb  4.0  /  TBili  0.3  /  DBili  x   /  AST  13  /  ALT  11  /  AlkPhos  63  [08-07-24 @ 17:37]    Creatinine Trend:  SCr 20.43 [08-07 @ 17:37]

## 2024-08-07 NOTE — ED ADULT TRIAGE NOTE - CHIEF COMPLAINT QUOTE
Pt visiting from [place], history of ESRD, has AV fistula to Rt arm, last full session of dialysis was Friday, pt endorses shortness of breath, pt was told by his nephrologist to come to ED for dialysis treatment.

## 2024-08-07 NOTE — H&P ADULT - NSICDXPASTMEDICALHX_GEN_ALL_CORE_FT
PAST MEDICAL HISTORY:  ESRD on dialysis      PAST MEDICAL HISTORY:  ESRD on dialysis     Hypertension

## 2024-08-07 NOTE — ED ADULT NURSE NOTE - NSFALLUNIVINTERV_ED_ALL_ED
Bed/Stretcher in lowest position, wheels locked, appropriate side rails in place/Call bell, personal items and telephone in reach/Instruct patient to call for assistance before getting out of bed/chair/stretcher/Non-slip footwear applied when patient is off stretcher/[place] to call system/Physically safe environment - no spills, clutter or unnecessary equipment/Purposeful proactive rounding/Room/bathroom lighting operational, light cord in reach

## 2024-08-07 NOTE — H&P ADULT - ASSESSMENT
MrVíctor Woodward is a 64 yo M PMH of ESRD presenting for missed HD.   Mr. Woodward is a 62 yo M PMH of ESRD(MWF) presenting for missed HD.   Mr. Woodward is a 62 yo M PMH of ESRD(MWF) presenting for missed HD being admitted for dialysis.    Mr. Woodward is a 62 yo M PMH of ESRD(MWF), HTN presenting for missed HD being admitted for dialysis. Found to have hyperkalemia as well. Otherwise HDS and asymptomatic

## 2024-08-07 NOTE — DISCHARGE NOTE PROVIDER - NSDCQMERRANDS_GEN_ALL_CORE
[employer] Authorization form for PT approval faxed to them at [phone].  Form placed in bin to be scanned.   
No

## 2024-08-07 NOTE — H&P ADULT - NSICDXPASTSURGICALHX_GEN_ALL_CORE_FT
PAST SURGICAL HISTORY:  No significant past surgical history      PAST SURGICAL HISTORY:  H/O splenectomy     S/P cholecystectomy
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
## 2024-08-07 NOTE — DISCHARGE NOTE PROVIDER - NSFOLLOWUPCLINICS_GEN_ALL_ED_FT
[facility]  Nephrology  [address] [place] [address], [address] [place] [address]  Phone: [phone]  Fax:   Follow Up Time: 1 week

## 2024-08-07 NOTE — ED PROVIDER NOTE - PROGRESS NOTE DETAILS
No
Donny - received call with lab result with hyperkalemia.  ECG reviewed and without peaked T-waves.  Will treat for hyperkalemia.  Nephrology aware.  Patient will require urgent HD.

## 2024-08-07 NOTE — DISCHARGE NOTE PROVIDER - ATTENDING DISCHARGE PHYSICAL EXAMINATION:
PHYSICAL EXAM:  Vital Signs Last 24 Hrs  T(C): 36.6 (08-07-24 @ 23:45)  T(F): 97.9 (08-07-24 @ 23:45), Max: 98.2 (08-07-24 @ 15:02)  HR: 67 (08-07-24 @ 23:45) (67 - 76)  BP: 168/88 (08-07-24 @ 23:45)  BP(mean): --  RR: 17 (08-07-24 @ 23:45) (16 - 18)  SpO2: 98% (08-07-24 @ 23:45) (98% - 100%)  Wt(kg): --    08-07 @ 07:01  -  08-08 @ 00:45  --------------------------------------------------------  IN: 400 mL / OUT: 3400 mL / NET: -3000 mL      Constitutional: NAD, awake and alert, well developed  EYES: EOMI, conjunctiva clear  ENT:  Normal Hearing, no tonsillar exudates   Neck: Soft and supple , no thyromegaly   Respiratory: Breath sounds are clear bilaterally, No wheezing, rales or rhonchi, no tachypnea, no accessory muscle use  Cardiovascular: S1 and S2, regular rate and rhythm, no Murmurs, gallops or rubs, no JVD, no leg edema  Gastrointestinal: Bowel Sounds present, soft, nontender, nondistended, no guarding, no rebound  Extremities: No cyanosis or clubbing; warm to touch  Vascular: 2+ peripheral pulses lower ex. R AVF noted  Neurological: No focal deficits, CN II-XII intact bilaterally, sensation to light touch intact in all extremities.   Musculoskeletal: 5/5 strength b/l upper and lower extremities; no joint swelling.  Skin: No rashes, no ulcerations

## 2024-08-07 NOTE — CONSULT NOTE ADULT - PROBLEM SELECTOR RECOMMENDATION 2
Pt with hyperkalemia on admission iso of missed HD. Serum potassium 6.7. Shifted in the ED. Plan for HD as above with 2K bath. Monitor labs. Ensure low potassium diet.     If you have any questions, please feel free to contact me  Da Caballero  Nephrology Fellow  [employer]/Page [phone]  (After 5pm or on weekends please page the on-call fellow)

## 2024-08-07 NOTE — H&P ADULT - BIRTH SEX
Detail Level: Generalized Instructions: This plan will send the code FBSE to the PM system.  DO NOT or CHANGE the price. Price (Do Not Change): 0.00 Male

## 2024-08-07 NOTE — H&P ADULT - PROBLEM SELECTOR PLAN 2
Diet: Renal  DVT-P:Heparin   Dispo: -S/P shift for hyperkalemia no EKG changes  -S/P Bicarbonate in the ED  -Likely secondary to Missed HD, plan as above   -BMP post HD K 6.7 and s/p Ca gluconate, sodium bicarb, insulin/D50, lokelma. EKG without changes  -tele  -Likely secondary to Missed HD, plan as above   -BMP post HD

## 2024-08-07 NOTE — H&P ADULT - PROBLEM SELECTOR PLAN 3
-Likely secondary to ESRD  -Can follow up with oupt nephrologist -Likely secondary to ESRD  -Can follow up with oupt nephrologist  -no bleeding

## 2024-08-08 VITALS
HEART RATE: 80 BPM | OXYGEN SATURATION: 100 % | SYSTOLIC BLOOD PRESSURE: 150 MMHG | DIASTOLIC BLOOD PRESSURE: 84 MMHG | RESPIRATION RATE: 20 BRPM | TEMPERATURE: 98 F

## 2024-08-08 LAB
ANION GAP SERPL CALC-SCNC: 17 MMOL/L — HIGH (ref 7–14)
BUN SERPL-MCNC: 58 MG/DL — HIGH (ref 7–23)
CHLORIDE SERPL-SCNC: 98 MMOL/L — SIGNIFICANT CHANGE UP (ref 98–107)
CREAT SERPL-MCNC: 10.27 MG/DL — HIGH (ref 0.5–1.3)
EGFR: 5 ML/MIN/1.73M2 — LOW
GLUCOSE SERPL-MCNC: 102 MG/DL — HIGH (ref 70–99)
HAV IGM SER-ACNC: SIGNIFICANT CHANGE UP
HBV CORE IGM SER-ACNC: SIGNIFICANT CHANGE UP
HBV SURFACE AG SER-ACNC: SIGNIFICANT CHANGE UP
HCV AB S/CO SERPL IA: 0.22 S/CO — SIGNIFICANT CHANGE UP (ref 0–0.99)
HCV AB SERPL-IMP: SIGNIFICANT CHANGE UP
MRSA PCR RESULT.: SIGNIFICANT CHANGE UP
PHOSPHATE SERPL-MCNC: 2.8 MG/DL — SIGNIFICANT CHANGE UP (ref 2.5–4.5)
POTASSIUM SERPL-MCNC: 3.8 MMOL/L — SIGNIFICANT CHANGE UP (ref 3.5–5.3)
POTASSIUM SERPL-SCNC: 3.8 MMOL/L — SIGNIFICANT CHANGE UP (ref 3.5–5.3)
S AUREUS DNA NOSE QL NAA+PROBE: SIGNIFICANT CHANGE UP
SODIUM SERPL-SCNC: 138 MMOL/L — SIGNIFICANT CHANGE UP (ref 135–145)

## 2024-08-08 PROCEDURE — 99239 HOSP IP/OBS DSCHRG MGMT >30: CPT

## 2024-08-08 NOTE — DISCHARGE NOTE NURSING/CASE MANAGEMENT/SOCIAL WORK - PATIENT PORTAL LINK FT
You can access the FollowMyHealth Patient Portal offered by [facility] by registering at the following website: [email or web address]. By joining [employer]’s FollowMyHealth portal, you will also be able to view your health information using other applications (apps) compatible with our system.

## 2024-08-08 NOTE — DISCHARGE NOTE NURSING/CASE MANAGEMENT/SOCIAL WORK - NSDCPEFALRISK_GEN_ALL_CORE
For information on Fall & Injury Prevention, visit: [email or web address] OR  [email or web address] OR  https://www.cdc.gov/steadi/patient.html